# Patient Record
Sex: MALE | Race: WHITE | HISPANIC OR LATINO
[De-identification: names, ages, dates, MRNs, and addresses within clinical notes are randomized per-mention and may not be internally consistent; named-entity substitution may affect disease eponyms.]

---

## 2023-12-05 ENCOUNTER — APPOINTMENT (OUTPATIENT)
Dept: CARDIOLOGY | Facility: CLINIC | Age: 23
End: 2023-12-05

## 2023-12-05 PROBLEM — Z00.00 ENCOUNTER FOR PREVENTIVE HEALTH EXAMINATION: Status: ACTIVE | Noted: 2023-12-05

## 2023-12-18 ENCOUNTER — EMERGENCY (EMERGENCY)
Facility: HOSPITAL | Age: 23
LOS: 1 days | Discharge: HOME | End: 2023-12-18
Attending: EMERGENCY MEDICINE
Payer: MEDICAID

## 2023-12-18 VITALS
RESPIRATION RATE: 18 BRPM | HEIGHT: 71 IN | TEMPERATURE: 98 F | HEART RATE: 93 BPM | OXYGEN SATURATION: 97 % | WEIGHT: 300.05 LBS | DIASTOLIC BLOOD PRESSURE: 92 MMHG | SYSTOLIC BLOOD PRESSURE: 160 MMHG

## 2023-12-18 DIAGNOSIS — I10 ESSENTIAL (PRIMARY) HYPERTENSION: ICD-10-CM

## 2023-12-18 DIAGNOSIS — R07.9 CHEST PAIN, UNSPECIFIED: ICD-10-CM

## 2023-12-18 LAB
ALBUMIN SERPL ELPH-MCNC: 4.6 G/DL — SIGNIFICANT CHANGE UP (ref 3.3–5.2)
ALBUMIN SERPL ELPH-MCNC: 4.6 G/DL — SIGNIFICANT CHANGE UP (ref 3.3–5.2)
ALP SERPL-CCNC: 58 U/L — SIGNIFICANT CHANGE UP (ref 40–120)
ALP SERPL-CCNC: 58 U/L — SIGNIFICANT CHANGE UP (ref 40–120)
ALT FLD-CCNC: 83 U/L — HIGH
ALT FLD-CCNC: 83 U/L — HIGH
ANION GAP SERPL CALC-SCNC: 12 MMOL/L — SIGNIFICANT CHANGE UP (ref 5–17)
ANION GAP SERPL CALC-SCNC: 12 MMOL/L — SIGNIFICANT CHANGE UP (ref 5–17)
APTT BLD: 33.3 SEC — SIGNIFICANT CHANGE UP (ref 24.5–35.6)
APTT BLD: 33.3 SEC — SIGNIFICANT CHANGE UP (ref 24.5–35.6)
AST SERPL-CCNC: 38 U/L — SIGNIFICANT CHANGE UP
AST SERPL-CCNC: 38 U/L — SIGNIFICANT CHANGE UP
BASOPHILS # BLD AUTO: 0.04 K/UL — SIGNIFICANT CHANGE UP (ref 0–0.2)
BASOPHILS # BLD AUTO: 0.04 K/UL — SIGNIFICANT CHANGE UP (ref 0–0.2)
BASOPHILS NFR BLD AUTO: 0.4 % — SIGNIFICANT CHANGE UP (ref 0–2)
BASOPHILS NFR BLD AUTO: 0.4 % — SIGNIFICANT CHANGE UP (ref 0–2)
BILIRUB SERPL-MCNC: 0.8 MG/DL — SIGNIFICANT CHANGE UP (ref 0.4–2)
BILIRUB SERPL-MCNC: 0.8 MG/DL — SIGNIFICANT CHANGE UP (ref 0.4–2)
BUN SERPL-MCNC: 9.2 MG/DL — SIGNIFICANT CHANGE UP (ref 8–20)
BUN SERPL-MCNC: 9.2 MG/DL — SIGNIFICANT CHANGE UP (ref 8–20)
CALCIUM SERPL-MCNC: 9.5 MG/DL — SIGNIFICANT CHANGE UP (ref 8.4–10.5)
CALCIUM SERPL-MCNC: 9.5 MG/DL — SIGNIFICANT CHANGE UP (ref 8.4–10.5)
CHLORIDE SERPL-SCNC: 102 MMOL/L — SIGNIFICANT CHANGE UP (ref 96–108)
CHLORIDE SERPL-SCNC: 102 MMOL/L — SIGNIFICANT CHANGE UP (ref 96–108)
CK SERPL-CCNC: 93 U/L — SIGNIFICANT CHANGE UP (ref 30–200)
CK SERPL-CCNC: 93 U/L — SIGNIFICANT CHANGE UP (ref 30–200)
CO2 SERPL-SCNC: 25 MMOL/L — SIGNIFICANT CHANGE UP (ref 22–29)
CO2 SERPL-SCNC: 25 MMOL/L — SIGNIFICANT CHANGE UP (ref 22–29)
CREAT SERPL-MCNC: 0.94 MG/DL — SIGNIFICANT CHANGE UP (ref 0.5–1.3)
CREAT SERPL-MCNC: 0.94 MG/DL — SIGNIFICANT CHANGE UP (ref 0.5–1.3)
D DIMER BLD IA.RAPID-MCNC: <150 NG/ML DDU — SIGNIFICANT CHANGE UP
D DIMER BLD IA.RAPID-MCNC: <150 NG/ML DDU — SIGNIFICANT CHANGE UP
EGFR: 118 ML/MIN/1.73M2 — SIGNIFICANT CHANGE UP
EGFR: 118 ML/MIN/1.73M2 — SIGNIFICANT CHANGE UP
EOSINOPHIL # BLD AUTO: 0.15 K/UL — SIGNIFICANT CHANGE UP (ref 0–0.5)
EOSINOPHIL # BLD AUTO: 0.15 K/UL — SIGNIFICANT CHANGE UP (ref 0–0.5)
EOSINOPHIL NFR BLD AUTO: 1.6 % — SIGNIFICANT CHANGE UP (ref 0–6)
EOSINOPHIL NFR BLD AUTO: 1.6 % — SIGNIFICANT CHANGE UP (ref 0–6)
GLUCOSE SERPL-MCNC: 99 MG/DL — SIGNIFICANT CHANGE UP (ref 70–99)
GLUCOSE SERPL-MCNC: 99 MG/DL — SIGNIFICANT CHANGE UP (ref 70–99)
HCT VFR BLD CALC: 46.9 % — SIGNIFICANT CHANGE UP (ref 39–50)
HCT VFR BLD CALC: 46.9 % — SIGNIFICANT CHANGE UP (ref 39–50)
HGB BLD-MCNC: 16.1 G/DL — SIGNIFICANT CHANGE UP (ref 13–17)
HGB BLD-MCNC: 16.1 G/DL — SIGNIFICANT CHANGE UP (ref 13–17)
IMM GRANULOCYTES NFR BLD AUTO: 0.3 % — SIGNIFICANT CHANGE UP (ref 0–0.9)
IMM GRANULOCYTES NFR BLD AUTO: 0.3 % — SIGNIFICANT CHANGE UP (ref 0–0.9)
INR BLD: 1.11 RATIO — SIGNIFICANT CHANGE UP (ref 0.85–1.18)
INR BLD: 1.11 RATIO — SIGNIFICANT CHANGE UP (ref 0.85–1.18)
LYMPHOCYTES # BLD AUTO: 2.42 K/UL — SIGNIFICANT CHANGE UP (ref 1–3.3)
LYMPHOCYTES # BLD AUTO: 2.42 K/UL — SIGNIFICANT CHANGE UP (ref 1–3.3)
LYMPHOCYTES # BLD AUTO: 25.1 % — SIGNIFICANT CHANGE UP (ref 13–44)
LYMPHOCYTES # BLD AUTO: 25.1 % — SIGNIFICANT CHANGE UP (ref 13–44)
MCHC RBC-ENTMCNC: 27.1 PG — SIGNIFICANT CHANGE UP (ref 27–34)
MCHC RBC-ENTMCNC: 27.1 PG — SIGNIFICANT CHANGE UP (ref 27–34)
MCHC RBC-ENTMCNC: 34.3 GM/DL — SIGNIFICANT CHANGE UP (ref 32–36)
MCHC RBC-ENTMCNC: 34.3 GM/DL — SIGNIFICANT CHANGE UP (ref 32–36)
MCV RBC AUTO: 78.8 FL — LOW (ref 80–100)
MCV RBC AUTO: 78.8 FL — LOW (ref 80–100)
MONOCYTES # BLD AUTO: 0.56 K/UL — SIGNIFICANT CHANGE UP (ref 0–0.9)
MONOCYTES # BLD AUTO: 0.56 K/UL — SIGNIFICANT CHANGE UP (ref 0–0.9)
MONOCYTES NFR BLD AUTO: 5.8 % — SIGNIFICANT CHANGE UP (ref 2–14)
MONOCYTES NFR BLD AUTO: 5.8 % — SIGNIFICANT CHANGE UP (ref 2–14)
NEUTROPHILS # BLD AUTO: 6.46 K/UL — SIGNIFICANT CHANGE UP (ref 1.8–7.4)
NEUTROPHILS # BLD AUTO: 6.46 K/UL — SIGNIFICANT CHANGE UP (ref 1.8–7.4)
NEUTROPHILS NFR BLD AUTO: 66.8 % — SIGNIFICANT CHANGE UP (ref 43–77)
NEUTROPHILS NFR BLD AUTO: 66.8 % — SIGNIFICANT CHANGE UP (ref 43–77)
PLATELET # BLD AUTO: 325 K/UL — SIGNIFICANT CHANGE UP (ref 150–400)
PLATELET # BLD AUTO: 325 K/UL — SIGNIFICANT CHANGE UP (ref 150–400)
POTASSIUM SERPL-MCNC: 4.4 MMOL/L — SIGNIFICANT CHANGE UP (ref 3.5–5.3)
POTASSIUM SERPL-MCNC: 4.4 MMOL/L — SIGNIFICANT CHANGE UP (ref 3.5–5.3)
POTASSIUM SERPL-SCNC: 4.4 MMOL/L — SIGNIFICANT CHANGE UP (ref 3.5–5.3)
POTASSIUM SERPL-SCNC: 4.4 MMOL/L — SIGNIFICANT CHANGE UP (ref 3.5–5.3)
PROT SERPL-MCNC: 7.7 G/DL — SIGNIFICANT CHANGE UP (ref 6.6–8.7)
PROT SERPL-MCNC: 7.7 G/DL — SIGNIFICANT CHANGE UP (ref 6.6–8.7)
PROTHROM AB SERPL-ACNC: 12.3 SEC — SIGNIFICANT CHANGE UP (ref 9.5–13)
PROTHROM AB SERPL-ACNC: 12.3 SEC — SIGNIFICANT CHANGE UP (ref 9.5–13)
RBC # BLD: 5.95 M/UL — HIGH (ref 4.2–5.8)
RBC # BLD: 5.95 M/UL — HIGH (ref 4.2–5.8)
RBC # FLD: 13 % — SIGNIFICANT CHANGE UP (ref 10.3–14.5)
RBC # FLD: 13 % — SIGNIFICANT CHANGE UP (ref 10.3–14.5)
SODIUM SERPL-SCNC: 139 MMOL/L — SIGNIFICANT CHANGE UP (ref 135–145)
SODIUM SERPL-SCNC: 139 MMOL/L — SIGNIFICANT CHANGE UP (ref 135–145)
TROPONIN T, HIGH SENSITIVITY RESULT: <6 NG/L — SIGNIFICANT CHANGE UP (ref 0–51)
WBC # BLD: 9.66 K/UL — SIGNIFICANT CHANGE UP (ref 3.8–10.5)
WBC # BLD: 9.66 K/UL — SIGNIFICANT CHANGE UP (ref 3.8–10.5)
WBC # FLD AUTO: 9.66 K/UL — SIGNIFICANT CHANGE UP (ref 3.8–10.5)
WBC # FLD AUTO: 9.66 K/UL — SIGNIFICANT CHANGE UP (ref 3.8–10.5)

## 2023-12-18 PROCEDURE — 71046 X-RAY EXAM CHEST 2 VIEWS: CPT | Mod: 26

## 2023-12-18 PROCEDURE — 99223 1ST HOSP IP/OBS HIGH 75: CPT

## 2023-12-18 PROCEDURE — 93010 ELECTROCARDIOGRAM REPORT: CPT | Mod: 76

## 2023-12-18 RX ORDER — ASPIRIN/CALCIUM CARB/MAGNESIUM 324 MG
324 TABLET ORAL ONCE
Refills: 0 | Status: COMPLETED | OUTPATIENT
Start: 2023-12-18 | End: 2023-12-18

## 2023-12-18 RX ORDER — METOPROLOL TARTRATE 50 MG
50 TABLET ORAL ONCE
Refills: 0 | Status: COMPLETED | OUTPATIENT
Start: 2023-12-18 | End: 2023-12-18

## 2023-12-18 RX ORDER — HYDROCHLOROTHIAZIDE 25 MG
1 TABLET ORAL
Refills: 0 | DISCHARGE

## 2023-12-18 RX ADMIN — Medication 50 MILLIGRAM(S): at 20:47

## 2023-12-18 RX ADMIN — Medication 324 MILLIGRAM(S): at 18:22

## 2023-12-18 NOTE — ED CDU PROVIDER INITIAL DAY NOTE - CLINICAL SUMMARY MEDICAL DECISION MAKING FREE TEXT BOX
21 yo male PMHx HTN (not on medications) presents to ED c/o sudden onset of chest pain, since resolved. Evaluated by cardiology recommending starting HCTZ and TTE.

## 2023-12-18 NOTE — ED ADULT NURSE NOTE - NSFALLUNIVINTERV_ED_ALL_ED
Bed/Stretcher in lowest position, wheels locked, appropriate side rails in place/Call bell, personal items and telephone in reach/Instruct patient to call for assistance before getting out of bed/chair/stretcher/Non-slip footwear applied when patient is off stretcher/[place] to call system/Physically safe environment - no spills, clutter or unnecessary equipment/Purposeful proactive rounding/Room/bathroom lighting operational, light cord in reach Bed/Stretcher in lowest position, wheels locked, appropriate side rails in place/Call bell, personal items and telephone in reach/Instruct patient to call for assistance before getting out of bed/chair/stretcher/Non-slip footwear applied when patient is off stretcher/[place] to call system/Physically safe environment - no spills, clutter or unnecessary equipment/Purposeful proactive rounding/Room/bathroom lighting operational, light cord in reach

## 2023-12-18 NOTE — ED CDU PROVIDER INITIAL DAY NOTE - OBJECTIVE STATEMENT
21 yo male PMHx HTN (not on medications) presents to ED c/o sudden onset of chest pain and he was painting around 2 PM that radiated to his left no substernal and felt like a warm sensation, since resolved. no syncope no fever chills.  Patient vapes on weekends. has no known family history of CAD.  Patient says that he had final exams recently and he did not sleep for 70 hours and he was tachycardic up to 140 and he still feels intermittent palpitations.  Patient had no recent weight gain and has been obese for a while.

## 2023-12-18 NOTE — ED PROVIDER NOTE - CLINICAL SUMMARY MEDICAL DECISION MAKING FREE TEXT BOX
Patient has acute onset chest pain that radiates to her left.  Patient is active smoker obese and has untreated hypertension.  Will rule out ACS call cardiology consult given aspirin and recheck vital signs and probable blood pressure control.  Will do the telemetry monitoring

## 2023-12-18 NOTE — CONSULT NOTE ADULT - NS ATTEND AMEND GEN_ALL_CORE FT
Patient seen and examined by me.  22-year-old obese male with PMHx of hypertension (no taking meds) presented to ED co sudden, radiating onset of chest pain while he was painting around 2 PM associated with palpitations.  Patient vapes on weekends  Denies smoking cig, etoh abuse,     T(C): 36.8 (12-18-23 @ 16:16), Max: 36.8 (12-18-23 @ 16:16)  HR: 53 (12-19-23 @ 08:16) (53 - 93)  BP: 160/63 (12-19-23 @ 08:16) (160/63 - 166/72)  RR: 18 (12-19-23 @ 07:17) (18 - 20)  SpO2: 98% (12-19-23 @ 07:17) (97% - 100%)  Patient alert and awake.  Chest- Bilateral Clear BS  Cardiac- S1 and S2  Abdomen- Soft    Assessment/Plan:  1. Chest pain  2. Palpitations  3. HTN    IF Echo and CCTA are normal, Patient may be discharged home, Patient is advised to f/u in the office , patient to have holter as outpatient.  Check TSH  I have discussed my recommendation with the PA which are outlined above.  Will sign off

## 2023-12-18 NOTE — ED CDU PROVIDER INITIAL DAY NOTE - ATTENDING APP SHARED VISIT CONTRIBUTION OF CARE
Nathaly LONGORIA-  22-year-old male with history of hypertension presents with sudden onset of chest pain and he was painting around 2 PM that radiated to his left no substernal and felt like a warm sensation.  Patient is still having pain in the left arm no syncope no fever chills.  Patient is active smoker has no known family history of CAD.  Patient says that he had final exams recently and he did not sleep for 70 hours and he was tachycardic up to 140 and he still feels intermittent palpitations.  Patient had no recent weight gain and has been obese for a while.      Pt is Alert obese young male, S1-S2 normal regular, bilateral clear breath sounds, abdomen is soft nontender nondistended, neuro exam alert oriented x 3 no focal deficits, skin warm dry        Patient had initial negative workup close continue blood pressure management    Call for cardio consult

## 2023-12-18 NOTE — CONSULT NOTE ADULT - SUBJECTIVE AND OBJECTIVE BOX
[facility]                                              CARDIOLOGY AT [place] [address]                                             Telephone: [phone]. Fax:[phone]      CARDIOLOGY CONSULTATION NOTE                                                                                             History obtained by: Patient and medical record  Community Cardiologist: None   obtained: No   Reason for Consultation: Chest pain    Chief complaint:   Patient is a 22y old  Male who presents with a chief complaint of chest pain.    HPI: 22-year-old obese male with PMHx of hypertension (no taking meds) presented to ED co sudden onset of chest pain while he was painting around 2 PM associated with palpitations. Chest pain was constant, radiated to his left arm and felt like a warm sensation. Denies alleviating / aggravating factors.  Patient vapes on weekends. . Patient says that he had final exams recently, he did not sleep for 70 hours, he was tachycardic up to 140 and he still feels intermittent palpitations.  Denies FHX of CAD. Denies syncope, fever, SOB.    CARDIAC TESTING     PAST MEDICAL HISTORY  HTN    PAST SURGICAL HISTORY  No significant past surgical history    SOCIAL HISTORY: + vaping weekends. Denies alcohol/drugs    FAMILY HISTORY:  Denies Fhx of CAD    Family History of Cardiovascular Disease:  No   Coronary Artery Disease in first degree relative:   No   Sudden Cardiac Death in First degree relative:  No     HOME MEDICATIONS:   None    ALLERGIES:   No Known Allergies    REVIEW OF SYMPTOMS: Asymptomatic at this time  CONSTITUTIONAL: No fever, no chills, no weight loss, no weight gain, no fatigue   ENMT:  No vertigo; No sinus or throat pain  NECK: No pain or stiffness  CARDIOVASCULAR: No chest pain (resolved), no dyspnea, no syncope/presyncope, no fatigue, no palpitations, no dizziness, no Orthopnea, no Paroxsymal nocturnal dyspnea  RESPIRATORY: No shortness of breath, no cough  : No dysuria, no hematuria   GI: no nausea, no diarrhea, no constipation, no abdominal pain  NEURO: No headache, no slurred speech   MUSCULOSKELETAL: No joint pain or swelling; No muscle, back, or extremity pain  PSYCH: No agitation, no anxiety.    ALL OTHER REVIEW OF SYSTEMS ARE NEGATIVE.    VITAL SIGNS:   T(C): 36.8 (12-18-23 @ 16:16), Max: 36.8 (12-18-23 @ 16:16)  T(F): 98.2 (12-18-23 @ 16:16), Max: 98.2 (12-18-23 @ 16:16)  HR: 93 (12-18-23 @ 16:16) (93 - 93)  BP: 160/92 (12-18-23 @ 16:16) (160/92 - 160/92)  RR: 18 (12-18-23 @ 16:16) (18 - 18)  SpO2: 97% (12-18-23 @ 16:16) (97% - 97%)    PHYSICAL EXAM:   Constitutional: Comfortable . No acute distress.   HEENT: Atraumatic and normocephalic , neck is supple . no JVD.   CNS: A&Ox3. No focal deficits.   Respiratory: CTAB, unlabored. No wheezing, No rhonchi. No crackles   Cardiovascular: RRR normal s1 s2. No murmur. No rubs or gallop.  Gastrointestinal: Soft, non-tender. +Bowel sounds.   Extremities: 2+ Peripheral Pulses, No edema  Psychiatric: Calm . no agitation.   Skin: Warm and dry    LABS:   ( 18 Dec 2023 18:30 )  Troponin T  X    ,  CPK  93   , CKMB  X    , BNP X                              16.1   9.66  )-----------( 325      ( 18 Dec 2023 18:30 )             46.9     12-18    139  |  102  |  9.2  ----------------------------<  99  4.4   |  25.0  |  0.94    Ca    9.5      18 Dec 2023 18:30    TPro  7.7  /  Alb  4.6  /  TBili  0.8  /  DBili  x   /  AST  38  /  ALT  83<H>  /  AlkPhos  58  12-18    PT/INR - ( 18 Dec 2023 18:30 )   PT: 12.3 sec;   INR: 1.11 ratio      PTT - ( 18 Dec 2023 18:30 )  PTT:33.3 sec    ECG: NSR with sinus arrhythmia   Prior ECG: No     RADIOLOGY & ADDITIONAL STUDIES:       Preliminary evaluation, please await official recommendations     Assessment and recommendations are final when note is signed by the attending.                                      [facility]                                              CARDIOLOGY AT [place] [address]                                             Telephone: [phone]. Fax:[phone]      CARDIOLOGY CONSULTATION NOTE                                                                                             History obtained by: Patient and medical record  Community Cardiologist: None   obtained: No   Reason for Consultation: Chest pain    Chief complaint:   Patient is a 22y old  Male who presents with a chief complaint of chest pain.    HPI: 22-year-old obese male with PMHx of hypertension (no taking meds) presented to ED co sudden onset of chest pain while he was painting around 2 PM associated with palpitations. Chest pain was constant, radiated to his left arm and felt like a warm sensation. Denies alleviating / aggravating factors.  Patient vapes on weekends. . Patient says that he had final exams recently, he did not sleep for 70 hours, he was tachycardic up to 140 and he still feels intermittent palpitations.  Denies FHX of CAD. Denies syncope, fever, SOB.    CARDIAC TESTING     PAST MEDICAL HISTORY  HTN    PAST SURGICAL HISTORY  No significant past surgical history    SOCIAL HISTORY: + vaping weekends. Denies alcohol/drugs    FAMILY HISTORY:  Denies Fhx of CAD    Family History of Cardiovascular Disease:  No   Coronary Artery Disease in first degree relative:   No   Sudden Cardiac Death in First degree relative:  No     HOME MEDICATIONS:   None    ALLERGIES:   No Known Allergies    REVIEW OF SYMPTOMS: Asymptomatic at this time  CONSTITUTIONAL: No fever, no chills, no weight loss, no weight gain, no fatigue   ENMT:  No vertigo; No sinus or throat pain  NECK: No pain or stiffness  CARDIOVASCULAR: No chest pain (resolved), no dyspnea, no syncope/presyncope, no fatigue, no palpitations, no dizziness, no Orthopnea, no Paroxsymal nocturnal dyspnea  RESPIRATORY: No shortness of breath, no cough  : No dysuria, no hematuria   GI: no nausea, no diarrhea, no constipation, no abdominal pain  NEURO: No headache, no slurred speech   MUSCULOSKELETAL: No joint pain or swelling; No muscle, back, or extremity pain  PSYCH: No agitation, no anxiety.    ALL OTHER REVIEW OF SYSTEMS ARE NEGATIVE.    VITAL SIGNS:   T(C): 36.8 (12-18-23 @ 16:16), Max: 36.8 (12-18-23 @ 16:16)  T(F): 98.2 (12-18-23 @ 16:16), Max: 98.2 (12-18-23 @ 16:16)  HR: 93 (12-18-23 @ 16:16) (93 - 93)  BP: 160/92 (12-18-23 @ 16:16) (160/92 - 160/92)  RR: 18 (12-18-23 @ 16:16) (18 - 18)  SpO2: 97% (12-18-23 @ 16:16) (97% - 97%)    PHYSICAL EXAM:   Constitutional: Comfortable . No acute distress.   HEENT: Atraumatic and normocephalic , neck is supple . no JVD.   CNS: A&Ox3. No focal deficits.   Respiratory: CTAB, unlabored. No wheezing, No rhonchi. No crackles   Cardiovascular: RRR normal s1 s2. No murmur. No rubs or gallop.  Gastrointestinal: Soft, non-tender. +Bowel sounds.   Extremities: 2+ Peripheral Pulses, No edema  Psychiatric: Calm . no agitation.   Skin: Warm and dry    LABS:   ( 18 Dec 2023 18:30 )  Troponin T  X    ,  CPK  93   , CKMB  X    , BNP X                              16.1   9.66  )-----------( 325      ( 18 Dec 2023 18:30 )             46.9     12-18    139  |  102  |  9.2  ----------------------------<  99  4.4   |  25.0  |  0.94    Ca    9.5      18 Dec 2023 18:30    TPro  7.7  /  Alb  4.6  /  TBili  0.8  /  DBili  x   /  AST  38  /  ALT  83<H>  /  AlkPhos  58  12-18    PT/INR - ( 18 Dec 2023 18:30 )   PT: 12.3 sec;   INR: 1.11 ratio      PTT - ( 18 Dec 2023 18:30 )  PTT:33.3 sec    ECG: NSR with sinus arrhythmia   Prior ECG: No     RADIOLOGY & ADDITIONAL STUDIES:       Preliminary evaluation, please await official recommendations     Assessment and recommendations are final when note is signed by the attending.

## 2023-12-18 NOTE — CONSULT NOTE ADULT - PROBLEM SELECTOR RECOMMENDATION 9
Pt 22-year-old obese male with PMHx of hypertension (no taking meds) presented to ED co sudden, radiating onset of chest pain while he was painting around 2 PM associated with palpitations.  Patient vapes on weekends. . Patient says that he had final exams recently, he did not sleep for 70 hours, he was tachycardic up to 140.  Pt received  mg x 1 in the ED and BB.   ECG: NSR with sinus arrhythmia   Trop x 2 negative  Obtain CT chest to ro PE  Telemonitor  Morphine PRN x chest pain   Trend trops x 3. Serial EKGs  Obtain A1C, lipid panel fasting, TFTs, sed rate to ro comorbidities   Obtain Echo to assess structural and functional status  Maintain K+~4 and mag~2  DASH diet  Quit smoking  Encourage daily exercise as tolerated and optimal weight management Pt 22-year-old obese male with PMHx of hypertension (no taking meds) presented to ED co sudden, radiating onset of chest pain while he was painting around 2 PM associated with palpitations.  Patient vapes on weekends. . Patient says that he had final exams recently, he did not sleep for 70 hours, he was tachycardic up to 140.  Pt received  mg x 1 in the ED and BB.   ECG: NSR with sinus arrhythmia   Trop x 2 negative  Obtain CCTA  Telemonitor  Morphine PRN x chest pain   Trend trops x 3. Serial EKGs  Obtain A1C, lipid panel fasting, TFTs, sed rate to ro comorbidities   Obtain Echo to assess structural and functional status  Maintain K+~4 and mag~2  DASH diet  Quit smoking  Encourage daily exercise as tolerated and optimal weight management

## 2023-12-18 NOTE — ED ADULT NURSE NOTE - OBJECTIVE STATEMENT
Assumed care patient in ED alert and oriented x4, stating " I have been painting all day and I went up my stairs and I started to feel palpations and then a pain shooting down my left arm" EKG done, patient placed on CM. NSR noted. Denies pain at rest. IV placed labs sent, ASA given. Will continue to monitor.

## 2023-12-18 NOTE — CONSULT NOTE ADULT - PROBLEM SELECTOR RECOMMENDATION 2
Uncontrolled. Pt not taking meds   Start Hydrochlorthiazide 12.5 mg OD with strict parameters  Monitor BP  DASH diet    Further recommendations base on above findings Uncontrolled. Pt not taking meds   Start Hydrochlorthiazide 12.5 mg OD with strict parameters  Monitor BP  DASH diet    Further recommendations base on above findings  Case discussed with Dr Tyson

## 2023-12-18 NOTE — ED PROVIDER NOTE - OBJECTIVE STATEMENT
22-year-old male with history of hypertension presents with sudden onset of chest pain and he was painting around 2 PM that radiated to his left no substernal and felt like a warm sensation.  Patient is still having pain in the left arm no syncope no fever chills.  Patient is active smoker has no known family history of CAD.  Patient says that he had final exams recently and he did not sleep for 70 hours and he was tachycardic up to 140 and he still feels intermittent palpitations.  Patient had no recent weight gain and has been obese for a while.

## 2023-12-18 NOTE — ED ADULT NURSE REASSESSMENT NOTE - NS ED NURSE REASSESS COMMENT FT1
Assumed care of pt from Bridget Peralta RN at 1915. Pt is resting comfortably in stretcher. NAD. Pt is A&Ox4. Respirations are even and unlabored. Color is appropriate for race. Pt awaiting cardiology consult. Pt NSR on CM. Pt updated on plan of care.

## 2023-12-19 ENCOUNTER — APPOINTMENT (OUTPATIENT)
Dept: CARDIOLOGY | Facility: CLINIC | Age: 23
End: 2023-12-19

## 2023-12-19 VITALS — HEART RATE: 53 BPM | DIASTOLIC BLOOD PRESSURE: 63 MMHG | SYSTOLIC BLOOD PRESSURE: 160 MMHG

## 2023-12-19 LAB
TSH SERPL-MCNC: 1.96 UIU/ML — SIGNIFICANT CHANGE UP (ref 0.27–4.2)
TSH SERPL-MCNC: 1.96 UIU/ML — SIGNIFICANT CHANGE UP (ref 0.27–4.2)

## 2023-12-19 PROCEDURE — 93017 CV STRESS TEST TRACING ONLY: CPT

## 2023-12-19 PROCEDURE — 75574 CT ANGIO HRT W/3D IMAGE: CPT | Mod: MA

## 2023-12-19 PROCEDURE — 93016 CV STRESS TEST SUPVJ ONLY: CPT

## 2023-12-19 PROCEDURE — 85730 THROMBOPLASTIN TIME PARTIAL: CPT

## 2023-12-19 PROCEDURE — 93018 CV STRESS TEST I&R ONLY: CPT

## 2023-12-19 PROCEDURE — 85379 FIBRIN DEGRADATION QUANT: CPT

## 2023-12-19 PROCEDURE — 99284 EMERGENCY DEPT VISIT MOD MDM: CPT

## 2023-12-19 PROCEDURE — 84443 ASSAY THYROID STIM HORMONE: CPT

## 2023-12-19 PROCEDURE — 71046 X-RAY EXAM CHEST 2 VIEWS: CPT

## 2023-12-19 PROCEDURE — 84484 ASSAY OF TROPONIN QUANT: CPT

## 2023-12-19 PROCEDURE — 75574 CT ANGIO HRT W/3D IMAGE: CPT | Mod: 26,MA

## 2023-12-19 PROCEDURE — 85610 PROTHROMBIN TIME: CPT

## 2023-12-19 PROCEDURE — 99285 EMERGENCY DEPT VISIT HI MDM: CPT | Mod: 25

## 2023-12-19 PROCEDURE — 93306 TTE W/DOPPLER COMPLETE: CPT | Mod: 26

## 2023-12-19 PROCEDURE — 36415 COLL VENOUS BLD VENIPUNCTURE: CPT

## 2023-12-19 PROCEDURE — 85025 COMPLETE CBC W/AUTO DIFF WBC: CPT

## 2023-12-19 PROCEDURE — G0378: CPT

## 2023-12-19 PROCEDURE — 82550 ASSAY OF CK (CPK): CPT

## 2023-12-19 PROCEDURE — 93005 ELECTROCARDIOGRAM TRACING: CPT

## 2023-12-19 PROCEDURE — 80053 COMPREHEN METABOLIC PANEL: CPT

## 2023-12-19 PROCEDURE — 99239 HOSP IP/OBS DSCHRG MGMT >30: CPT

## 2023-12-19 PROCEDURE — C8929: CPT

## 2023-12-19 RX ORDER — METOPROLOL TARTRATE 50 MG
50 TABLET ORAL ONCE
Refills: 0 | Status: COMPLETED | OUTPATIENT
Start: 2023-12-19 | End: 2023-12-19

## 2023-12-19 RX ORDER — METOPROLOL TARTRATE 50 MG
50 TABLET ORAL ONCE
Refills: 0 | Status: DISCONTINUED | OUTPATIENT
Start: 2023-12-19 | End: 2023-12-26

## 2023-12-19 RX ORDER — METOPROLOL TARTRATE 50 MG
50 TABLET ORAL ONCE
Refills: 0 | Status: DISCONTINUED | OUTPATIENT
Start: 2023-12-19 | End: 2023-12-19

## 2023-12-19 RX ORDER — HYDROCHLOROTHIAZIDE 25 MG
1 TABLET ORAL
Qty: 30 | Refills: 0
Start: 2023-12-19 | End: 2024-01-17

## 2023-12-19 NOTE — ED CDU PROVIDER DISPOSITION NOTE - ATTENDING CONTRIBUTION TO CARE
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

## 2023-12-19 NOTE — ED CDU PROVIDER DISPOSITION NOTE - NSFOLLOWUPINSTRUCTIONS_ED_ALL_ED_FT
- Return to the ED for any new or worsening symptoms.   - Follow up with your doctor within 2-3 days.   - Follow up with cardiologist  - Take medication as directed    Chest Pain    Chest pain can be caused by many different conditions which may or may not be dangerous. Causes include heartburn, lung infections, heart attack, blood clot in lungs, skin infections, strain or damage to muscle, cartilage, or bones, etc. In addition to a history and physical examination, an electrocardiogram (ECG) or other lab tests may have been performed to determine the cause of your chest pain. Follow up with your primary care provider or with a cardiologist as instructed.     SEEK IMMEDIATE MEDICAL CARE IF YOU HAVE ANY OF THE FOLLOWING SYMPTOMS: worsening chest pain, coughing up blood, unexplained back/neck/jaw pain, severe abdominal pain, dizziness or lightheadedness, fainting, shortness of breath, sweaty or clammy skin, vomiting, or racing heart beat. These symptoms may represent a serious problem that is an emergency. Do not wait to see if the symptoms will go away. Get medical help right away. Call 911 and do not drive yourself to the hospital.     Hypertension    Hypertension, commonly called high blood pressure, is when the force of blood pumping through your arteries is too strong. Hypertension forces your heart to work harder to pump blood. Your arteries may become narrow or stiff. Having untreated or uncontrolled hypertension for a long period of time can cause heart attack, stroke, kidney disease, and other problems. If started on a medication, take exactly as prescribed by your health care professional. Maintain a healthy lifestyle and follow up with your primary care physician.    SEEK IMMEDIATE MEDICAL CARE IF YOU HAVE ANY OF THE FOLLOWING SYMPTOMS: severe headache, confusion, chest pain, abdominal pain, vomiting, or shortness of breath.

## 2023-12-19 NOTE — ED CDU PROVIDER SUBSEQUENT DAY NOTE - PROGRESS NOTE DETAILS
CT calcium score 0. 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 spoke with cardiology, will order treadmill stress test

## 2023-12-19 NOTE — ED CDU PROVIDER SUBSEQUENT DAY NOTE - ATTENDING APP SHARED VISIT CONTRIBUTION OF CARE
ED c/o sudden onset of chest pain, since resolved. Evaluated by cardiology recommending starting HCTZ, TTE/CCTA.

## 2023-12-19 NOTE — ED CDU PROVIDER SUBSEQUENT DAY NOTE - CLINICAL SUMMARY MEDICAL DECISION MAKING FREE TEXT BOX
23 yo male PMHx HTN (not on medications) presents to ED c/o sudden onset of chest pain, since resolved. Evaluated by cardiology recommending starting HCTZ, TTE/CCTA. 21 yo male PMHx HTN (not on medications) presents to ED c/o sudden onset of chest pain, since resolved. Evaluated by cardiology recommending starting HCTZ, TTE/CCTA.

## 2023-12-19 NOTE — ED CDU PROVIDER DISPOSITION NOTE - CARE PROVIDERS DIRECT ADDRESSES
[email or web address]

## 2023-12-19 NOTE — ED CDU PROVIDER DISPOSITION NOTE - CARE PROVIDER_API CALL
Maribeth Tyson  Cardiology  [address] [place] [address]  Phone: [phone]  Fax: [phone]  Follow Up Time:    Maribeth Tyson  Cardiology  [address] [place] [address]  Phone: [phone]  Fax: [phone]  Follow Up Time:

## 2023-12-19 NOTE — ED CDU PROVIDER DISPOSITION NOTE - PATIENT PORTAL LINK FT
You can access the FollowMyHealth Patient Portal offered by [facility] by registering at the following website: [email or web address]. By joining [employer]’s FollowMyHealth portal, you will also be able to view your health information using other applications (apps) compatible with our system. You can access the FollowMyHealth Patient Portal offered by [facility] by registering at the following website: [email or web address]. By joining [employer]’s FollowMyHealth portal, you will also be able to view your health information using other applications (apps) compatible with our system.

## 2023-12-19 NOTE — ED CDU PROVIDER SUBSEQUENT DAY NOTE - WET READ LAUNCH FT
There are no Wet Read(s) to document. Niacinamide Counseling: I recommended taking niacin or niacinamide, also know as vitamin B3, twice daily. Recent evidence suggests that taking vitamin B3 (500 mg twice daily) can reduce the risk of actinic keratoses and non-melanoma skin cancers. Side effects of vitamin B3 include flushing and headache.

## 2023-12-19 NOTE — ED CDU PROVIDER DISPOSITION NOTE - CLINICAL COURSE
21yo M PMHx HTN (not on medications) presented to ED c/o chest pain, since resolved. ED labs, CXR unremarkable, trop negative. Pt evaluated by cardiology - recommending starting HCTZ and checking TTE, CTCA. Trop neg x 2, TSH wnl. 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 23yo M PMHx HTN (not on medications) presented to ED c/o chest pain, since resolved. ED labs, CXR unremarkable, trop negative. Pt evaluated by cardiology - recommending starting HCTZ and checking TTE, CTCA. Trop neg x 2, TSH wnl. 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 23yo M PMHx HTN (not on medications) presented to ED c/o chest pain, since resolved. ED labs, CXR unremarkable, trop negative. Pt evaluated by cardiology - recommending starting HCTZ and checking TTE, CTCA. Trop neg x 2, TSH wnl. 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 Exercise stress test WNL, no ischemic ecg changes, no chest pain. Pt resting comfortably, no chest pain. provided copy of all results and sent rx for HCTZ. return precautions discussed.

## 2023-12-19 NOTE — ED ADULT NURSE REASSESSMENT NOTE - NS ED NURSE REASSESS COMMENT FT1
assumed care of pt at 0715. report received from JAMES Connolly. rr even and unlabored. continued cardiac monitoring in place. anox4. awaiting echo. pt educated on plan of care, pt able to successfully teach back plan of care to RN, RN will continue to reeducate pt during hospital stay.

## 2023-12-19 NOTE — ED ADULT NURSE REASSESSMENT NOTE - NS ED NURSE REASSESS COMMENT FT1
Pt is resting in bed comfortably at this time, no apparent distress noted at this time. Pt safety maintained. Pt denies any complaints at this time. Plan of care on going.

## 2024-09-09 ENCOUNTER — EMERGENCY (EMERGENCY)
Facility: HOSPITAL | Age: 24
LOS: 1 days | Discharge: HOME | End: 2024-09-09
Attending: EMERGENCY MEDICINE
Payer: COMMERCIAL

## 2024-09-09 VITALS
WEIGHT: 307.99 LBS | RESPIRATION RATE: 20 BRPM | HEIGHT: 71 IN | DIASTOLIC BLOOD PRESSURE: 102 MMHG | SYSTOLIC BLOOD PRESSURE: 165 MMHG | OXYGEN SATURATION: 98 % | HEART RATE: 88 BPM | TEMPERATURE: 98 F

## 2024-09-09 PROBLEM — Z78.9 OTHER SPECIFIED HEALTH STATUS: Chronic | Status: ACTIVE | Noted: 2023-12-18

## 2024-09-09 LAB
HETEROPH AB TITR SER AGGL: NEGATIVE — SIGNIFICANT CHANGE UP
S PYO DNA THROAT QL NAA+PROBE: SIGNIFICANT CHANGE UP

## 2024-09-09 PROCEDURE — 36415 COLL VENOUS BLD VENIPUNCTURE: CPT

## 2024-09-09 PROCEDURE — 86308 HETEROPHILE ANTIBODY SCREEN: CPT

## 2024-09-09 PROCEDURE — 87798 DETECT AGENT NOS DNA AMP: CPT

## 2024-09-09 PROCEDURE — 96372 THER/PROPH/DIAG INJ SC/IM: CPT

## 2024-09-09 PROCEDURE — 87651 STREP A DNA AMP PROBE: CPT

## 2024-09-09 PROCEDURE — 99283 EMERGENCY DEPT VISIT LOW MDM: CPT

## 2024-09-09 PROCEDURE — 99284 EMERGENCY DEPT VISIT MOD MDM: CPT

## 2024-09-09 RX ORDER — AMOXICILLIN AND CLAVULANATE POTASSIUM 250; 125 MG/1; MG/1
1 TABLET, FILM COATED ORAL ONCE
Refills: 0 | Status: COMPLETED | OUTPATIENT
Start: 2024-09-09 | End: 2024-09-09

## 2024-09-09 RX ORDER — AMOXICILLIN AND CLAVULANATE POTASSIUM 250; 125 MG/1; MG/1
1 TABLET, FILM COATED ORAL
Qty: 20 | Refills: 0
Start: 2024-09-09 | End: 2024-09-18

## 2024-09-09 RX ORDER — DEXAMETHASONE 0.75 MG
8 TABLET ORAL ONCE
Refills: 0 | Status: COMPLETED | OUTPATIENT
Start: 2024-09-09 | End: 2024-09-09

## 2024-09-09 RX ORDER — CLINDAMYCIN PHOSPHATE 150 MG/ML
1 VIAL (ML) INJECTION
Qty: 30 | Refills: 0
Start: 2024-09-09 | End: 2024-09-18

## 2024-09-09 RX ORDER — KETOROLAC TROMETHAMINE 30 MG/ML
30 INJECTION, SOLUTION INTRAMUSCULAR ONCE
Refills: 0 | Status: DISCONTINUED | OUTPATIENT
Start: 2024-09-09 | End: 2024-09-09

## 2024-09-09 RX ADMIN — Medication 8 MILLIGRAM(S): at 10:16

## 2024-09-09 RX ADMIN — KETOROLAC TROMETHAMINE 30 MILLIGRAM(S): 30 INJECTION, SOLUTION INTRAMUSCULAR at 12:25

## 2024-09-09 RX ADMIN — AMOXICILLIN AND CLAVULANATE POTASSIUM 1 TABLET(S): 250; 125 TABLET, FILM COATED ORAL at 12:25

## 2024-09-09 NOTE — ED PROVIDER NOTE - OBJECTIVE STATEMENT
23-year-old male presented to ED complaining of sore throat x 5 days.  Patient explained that he was seen in urgent care on Friday where he was prescribed medication after an examination of his throat.  Patient seen and was given cefdinir which she has been taking but does not seem to notice any improvement on the symptoms.  Patient admits to painful swallowing, eating and drinking but denies any inability to swallow or eat or drink.  Patient denies any signal past medical or surgical illness.  Patient states that only medications taken as directed.  Patient denies any other issue at this time.

## 2024-09-09 NOTE — ED ADULT NURSE NOTE - OBJECTIVE STATEMENT
Pt is a 23YOM who is here with pain in his throat x 5 days, pt states that he as at urgent care on friday where he was given meds for his throat, pt was given cefdinir, pt states he has not seen any improvement in his symptoms, states he has painful swallowing, but denies any respiratory difficulty, or eating.

## 2024-09-09 NOTE — ED PROVIDER NOTE - ATTENDING APP SHARED VISIT CONTRIBUTION OF CARE
indep eval  pt with ST and swollen glands  pain w swallowing  pe sig tonsils enlarged w exudate bilaterall no uvula swelling  plan is viral swab and strep swab  meds for comfort  change AB  Both tests are normal however given clinical picture and chance of false neg testing perhaps due to partial tx a Cef...AB will change AB and have pt fu w pcp as outpt  agree w plan

## 2024-09-09 NOTE — ED ADULT TRIAGE NOTE - NS ED TRIAGE AVPU SCALE
Alert-The patient is alert, awake and responds to voice. The patient is oriented to time, place, and person. The triage nurse is able to obtain subjective information. 1

## 2024-09-09 NOTE — ED PROVIDER NOTE - CLINICAL SUMMARY MEDICAL DECISION MAKING FREE TEXT BOX
23-year-old male presented to ED complaining of sore throat x 5 days.  Patient explained that he was seen in urgent care on Friday where he was prescribed medication after an examination of his throat.  Patient seen and was given cefdinir which she has been taking but does not seem to notice any improvement on the symptoms.  Patient admits to painful swallowing, eating and drinking but denies any inability to swallow or eat or drink.  Patient denies any signal past medical or surgical illness.  Patient states that only medications taken as directed.   HEENT: Normal findings, Eyes : PERRLA, EOMI , Nares clear and Throat : + Exudates bilateral with no signs of Peritonsillar abscess.   Lungs: Clear B/L with good air entry  CVS: S1-S2 , with no murmurs  Abd : Normal BS, with no tenderness or organomegaly  Ext: Normal findings

## 2024-09-09 NOTE — ED PROVIDER NOTE - PATIENT PORTAL LINK FT
You can access the FollowMyHealth Patient Portal offered by [facility] by registering at the following website: [email or web address]. By joining [facility]’s FollowMyHealth portal, you will also be able to view your health information using other applications (apps) compatible with our system.

## 2024-09-09 NOTE — ED PROVIDER NOTE - WORK/EXCUSE FORM DATE
Post-Op Assessment Note    CV Status:  Stable  Pain Score: 0    Pain management: adequate     Mental Status:  Alert and awake   Hydration Status:  Euvolemic   PONV Controlled:  Controlled   Airway Patency:  Patent      Post Op Vitals Reviewed: Yes      Staff: CRNA         No complications documented      BP   100/56   Temp     Pulse  101   Resp  20   SpO2   98
10-Sep-2024

## 2024-09-09 NOTE — ED PROVIDER NOTE - NSFOLLOWUPINSTRUCTIONS_ED_ALL_ED_FT
Continue medication as prescribed  Continue work over-the-counter lozenges to soothe your throat  Follow-up with your primary care provider as discussed

## 2024-09-09 NOTE — ED ADULT TRIAGE NOTE - CHIEF COMPLAINT QUOTE
Pt arrives to ED c/o sore throat - was diagnosed with tonsilitis   on Friday , on Cefdinir - not getting better